# Patient Record
(demographics unavailable — no encounter records)

---

## 2025-07-24 NOTE — PHYSICAL EXAM
[de-identified] : Constitutional o Appearance : well-nourished, well developed, alert, in no acute distress  Head and Face o Head :  Inspection : atraumatic, normocephalic o Face :  Inspection : no visible rash or discoloration Respiratory o Respiratory Effort: breathing unlabored  Neurologic o Sensation : Normal sensation  Psychiatric o Mood and Affect: mood normal, affect appropriate  Lymphatic o Additional Nodes : No palpable lymph nodes present   Cervical Spine o Inspection/Palpation :  Inspection : alignment midline, normal degree of lordosis present  Skin : normal appearance, no masses or tenderness, trachea midline  Palpation : musculature is nontender to palpation o Range of Motion : arc of motion full in all planes, no crepitance or pain with ROM o Tests: Negative Spurlings test  Right Upper Extremity o Right Shoulder :  Inspection/Palpation : mild anterior capsular tenderness, no swelling or deformities, low lying biceps   Range of Motion : mid arch discomfort and pain with eccentric motion, forward flexion painful ER, , no crepitance  Strength : forward elevation 5/5, IR 5/5, ER 5/5, ER at 90 of abduction 5/5, supraspinatus 5/5, adduction 5/5, abduction 5/5, biceps/triceps 5/5,  5/5  Stability : no joint instability on provocative testing   Tests: Key mild positive, Neer negative, Carlee negative, drop arm test negative, Fontana's test positive   Left Upper Extremity o Left Shoulder :  Inspection/Palpation : left paracervical tenderness  Range of Motion : full and painless in all planes, no crepitance  Strength : forward elevation 5/5, IR 5/5, ER 5/5, ER at 90 of abduction 5/5, supraspinatus 5/5, adduction 5/5, abduction 5/5, biceps/triceps 5/5,  5/5  Stability : no joint instability on provocative testing   Tests: Key negative, Neer negative, Carlee negative, drop arm test negative, Fontana's test negative, negative Spurling's test  Gait and Station: Gait: gait normal, no significant extremity swelling or lymphedema, good proprioception and balance  Radiology Results 7/24/2025 o Cervical Spine : AP and lateral views were obtained and revealed diffuse deg disc disease C3-C4 and an apparant fusion C5-C6 o Right Shoulder : AP internal/external rotation and outlet views were obtained and 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

## 2025-07-24 NOTE — DISCUSSION/SUMMARY
[de-identified] : I went over the pathophysiology of the patient's symptoms in great detail with the patient. I discussed the underlying pathophysiology of the patient's condition in great detail with the patient. I went over the patient's x-rays with them in great detail. The extent of the patient's arthritis was discussed in great detail with them. At this time, he will start a course of physical therapy for strengthening and flexibility. A prescription was provided. We discussed the use of moist heat to relieve spasm. We discussed the use of Tylenol to relieve his pain.  All of their questions were answered. They understand and consent to the plan.   FU in 6 weeks. If he does not improve, we will request an MRI of his cervical spine.

## 2025-07-24 NOTE — HISTORY OF PRESENT ILLNESS
[de-identified] : 79-year-old RHD male presents complaining of neck and bilateral shoulder pain. He denies injury. It radiates into his proximal upper arm. It does not radiate past his elbow or cause numbness and tingling. He notes that his motion is limited. He cannot lay on his right side. He denies weakness of the right shoulder. He notes the motion is not limited. He notes he fell into a wall and banged the right shoulder. Of note, he tripped a few weeks ago and hit his right shoulder into the wall. He thinks this worsened his symptoms. He notes he has 1 stent. He notes his sugars are well controlled at this time. He had gallbladder  surgery more than 20 years ago.  He states he is a diabetic, and is on Plavix.  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

## 2025-07-24 NOTE — ADDENDUM
[FreeTextEntry1] : I, FILI SANCHESSSEF, acted solely as a scribe for Dr. Christopher Alicea on this date 07/24/2025. All medical record entries made by the Scribe were at my, Dr. Christopher Alicea, direction and personally dictated by me on 07/24/2025. I have reviewed the chart and agree that the record accurately reflects my personal performance of the history, physical exam, assessment and plan. I have also personally directed, reviewed, and agreed with the chart.